# Patient Record
Sex: MALE | Race: WHITE | Employment: FULL TIME | URBAN - METROPOLITAN AREA
[De-identification: names, ages, dates, MRNs, and addresses within clinical notes are randomized per-mention and may not be internally consistent; named-entity substitution may affect disease eponyms.]

---

## 2023-02-15 ENCOUNTER — OFFICE VISIT (OUTPATIENT)
Dept: URGENT CARE | Age: 23
End: 2023-02-15

## 2023-02-15 VITALS
DIASTOLIC BLOOD PRESSURE: 86 MMHG | TEMPERATURE: 98 F | HEIGHT: 71 IN | OXYGEN SATURATION: 97 % | BODY MASS INDEX: 30.8 KG/M2 | SYSTOLIC BLOOD PRESSURE: 124 MMHG | RESPIRATION RATE: 18 BRPM | WEIGHT: 220 LBS | HEART RATE: 84 BPM

## 2023-02-15 DIAGNOSIS — G89.29 CHRONIC PAIN OF RIGHT UPPER EXTREMITY: Primary | ICD-10-CM

## 2023-02-15 DIAGNOSIS — M79.601 CHRONIC PAIN OF RIGHT UPPER EXTREMITY: Primary | ICD-10-CM

## 2023-02-15 DIAGNOSIS — R03.0 ELEVATED BP WITHOUT DIAGNOSIS OF HYPERTENSION: ICD-10-CM

## 2023-02-15 RX ORDER — NAPROXEN 500 MG/1
500 TABLET ORAL 2 TIMES DAILY WITH MEALS
Qty: 60 TABLET | Refills: 0 | Status: SHIPPED | OUTPATIENT
Start: 2023-02-15 | End: 2023-03-17

## 2023-02-15 ASSESSMENT — ENCOUNTER SYMPTOMS
COUGH: 0
SORE THROAT: 0
SHORTNESS OF BREATH: 0

## 2023-02-15 NOTE — PATIENT INSTRUCTIONS
Naproxen as recommended  Orthopedic referral due to chronic pain (referral placed)  Monitor BP at home and call PCP if persistently elevated. Follow up with PCP in 7 days if symptoms persist or if symptoms worsen.   New Prescriptions    NAPROXEN (NAPROSYN) 500 MG TABLET    Take 1 tablet by mouth 2 times daily (with meals)

## 2023-02-15 NOTE — PROGRESS NOTES
Louann Butterfield (:  2000) is a 23 y.o. male,New patient, here for evaluation of the following chief complaint(s):  Arm Pain (Right arm pain , started when he was in high school , and now it is starting to act up and he is having pain when moving his arm )      ASSESSMENT/PLAN:    ICD-10-CM    1. Chronic pain of right upper extremity  M79.601 naproxen (NAPROSYN) 500 MG tablet    G89.29 María Renae MD, Orthopedics and Sports Medicine (Shoulder; Elbow), [facility]      2. Elevated BP without diagnosis of hypertension  R03.0           Naproxen as recommended  Orthopedic referral due to chronic pain (referral placed)  Monitor BP at home and call PCP if persistently elevated. Follow up with PCP in 7 days if symptoms persist or if symptoms worsen. SUBJECTIVE/OBJECTIVE:    History provided by:  Patient   used: No    HPI:   23 y.o. male presents with symptoms of right arm pain ongoing for 2-3 years. Feels like pain has been worsening since throwing football one month ago. Denies known injury. Describes right arm pain as constant with movement, rates 5/10, aggravated by any movement, and improves with rest. Denies neck pain and numbness/tingling in hands. Has not taken any medication for symptoms. Vitals:    02/15/23 1627   BP: 124/86   Pulse: 84   Resp: 18   Temp: 98 °F (36.7 °C)   SpO2: 97%   Weight: 220 lb (99.8 kg)   Height: 5' 11\" (1.803 m)       Review of Systems   Constitutional:  Negative for fever. HENT:  Negative for sore throat. Respiratory:  Negative for cough and shortness of breath. Musculoskeletal:         Right arm pain from shoulder to elbow as described in HPI; feels ROM in arm is slightly limited due to pain   All other systems reviewed and are negative. Physical Exam  Vitals reviewed. Constitutional:       Appearance: Normal appearance. HENT:      Head: Normocephalic and atraumatic.    Cardiovascular:      Rate and Rhythm: Normal rate and regular rhythm. Heart sounds: Normal heart sounds. Pulmonary:      Effort: Pulmonary effort is normal.      Breath sounds: Normal breath sounds. Musculoskeletal:      Right shoulder: Tenderness present. No swelling, deformity, effusion or crepitus. Normal pulse. Arms:       Cervical back: Normal range of motion and neck supple. No rigidity or tenderness. Comments: Mild tenderness with palpation  Mildly reduced ROM with extension   Color, temperature, and pulses to right arm normal   Lymphadenopathy:      Cervical: No cervical adenopathy. Neurological:      Mental Status: He is alert and oriented to person, place, and time. Psychiatric:         Mood and Affect: Mood normal.         Behavior: Behavior normal.         An electronic signature was used to authenticate this note.     --Thanh Dumont, APRN - CNP